# Patient Record
Sex: FEMALE | Race: WHITE | NOT HISPANIC OR LATINO | Employment: UNEMPLOYED | URBAN - METROPOLITAN AREA
[De-identification: names, ages, dates, MRNs, and addresses within clinical notes are randomized per-mention and may not be internally consistent; named-entity substitution may affect disease eponyms.]

---

## 2021-01-15 ENCOUNTER — OFFICE VISIT (OUTPATIENT)
Dept: ORTHOPEDIC SURGERY | Facility: CLINIC | Age: 26
End: 2021-01-15

## 2021-01-15 VITALS
HEART RATE: 64 BPM | WEIGHT: 159.4 LBS | BODY MASS INDEX: 28.24 KG/M2 | DIASTOLIC BLOOD PRESSURE: 69 MMHG | HEIGHT: 63 IN | SYSTOLIC BLOOD PRESSURE: 101 MMHG

## 2021-01-15 DIAGNOSIS — M25.511 RIGHT SHOULDER PAIN, UNSPECIFIED CHRONICITY: ICD-10-CM

## 2021-01-15 DIAGNOSIS — G25.89 SCAPULAR DYSKINESIS: Primary | ICD-10-CM

## 2021-01-15 PROCEDURE — 99203 OFFICE O/P NEW LOW 30 MIN: CPT | Performed by: FAMILY MEDICINE

## 2021-01-15 RX ORDER — ASCORBIC ACID 100 MG
500 TABLET,CHEWABLE ORAL
COMMUNITY

## 2021-01-15 RX ORDER — SACCHAROMYCES BOULARDII 250 MG
250 CAPSULE ORAL 2 TIMES DAILY
COMMUNITY

## 2021-01-15 NOTE — PROGRESS NOTES
Primary Care Sports Medicine Office Visit Note     Patient ID: Liane Thorne is a 25 y.o. female.    Chief Complaint:  Chief Complaint   Patient presents with   • Right Shoulder - Initial Evaluation, Pain     Rock climbing in 2017, injured at that time     HPI:    Ms. Liane Thorne is a 25 y.o. female , who is 19w1d, who presents to the clinic today for R shoulder pain. She states that she had an injury 3 years ago rock climbing. She had frozen shoulder at that time, and worked through this issue with PT. She had a repetitive irritation to this shoulder musculature a year ago as well. Near constant pain now. Has 7 month of, that is difficult to  and hold now as well. Cannot hold her for any extended period without severe shoulder pain. Points to her scapula when describing her pain. No numbness or tingling. Pain radiates mildly to the lateral shoulder and lateral upper arm. Feels she has mild weakness of the R shoulder as well.     History reviewed. No pertinent past medical history.    Past Surgical History:   Procedure Laterality Date   • CHOLECYSTECTOMY     • DILATATION AND CURETTAGE         Family History   Problem Relation Age of Onset   • Heart failure Mother    • No Known Problems Father    • Heart failure Sister    • Heart failure Brother    • No Known Problems Son    • No Known Problems Daughter    • Heart failure Maternal Grandmother    • No Known Problems Maternal Grandfather    • No Known Problems Paternal Grandmother    • No Known Problems Paternal Grandfather    • No Known Problems Cousin      Social History     Occupational History   • Not on file   Tobacco Use   • Smoking status: Never Smoker   • Smokeless tobacco: Never Used   Substance and Sexual Activity   • Alcohol use: No   • Drug use: No   • Sexual activity: Defer      Review of Systems   Constitutional: Negative for activity change and fever.   Respiratory: Negative for cough and shortness of breath.    Cardiovascular: Negative for  "chest pain.   Gastrointestinal: Negative for constipation, diarrhea, nausea and vomiting.   Musculoskeletal: Positive for arthralgias.   Skin: Negative for color change and rash.   Neurological: Negative for weakness.   Hematological: Does not bruise/bleed easily.       Objective:    /69 (BP Location: Left arm, Patient Position: Sitting, Cuff Size: Large Adult)   Pulse 64   Ht 160 cm (63\")   Wt 72.3 kg (159 lb 6.4 oz)   BMI 28.24 kg/m²     Physical Examination:  Physical Exam  Vitals signs and nursing note reviewed.   Constitutional:       General: She is not in acute distress.     Appearance: She is well-developed. She is not diaphoretic.   HENT:      Head: Normocephalic and atraumatic.   Eyes:      Conjunctiva/sclera: Conjunctivae normal.   Pulmonary:      Effort: Pulmonary effort is normal. No respiratory distress.   Skin:     General: Skin is warm.      Capillary Refill: Capillary refill takes less than 2 seconds.   Neurological:      Mental Status: She is alert.       Right Shoulder Exam     Tenderness   Right shoulder tenderness location: Right-sided upper trapezius musculature, right-sided rhomboid and thoracic paraspinal musculature.    Range of Motion   Active abduction: normal   Passive abduction: normal   Extension: normal   External rotation: normal   Forward flexion: normal   Internal rotation 0 degrees: normal     Muscle Strength   External rotation: 5/5   Supraspinatus: 5/5   Subscapularis: 5/5   Biceps: 5/5     Tests   Layne test: negative  Drop arm: negative  Sulcus: absent    Other   Erythema: absent  Sensation: normal  Pulse: present    Comments:  Negative Calvin, negative resisted external rotation, negative liftoff.  Negative Linn's, negative scarf.  Negative Neer.  Negative speeds/Yergason's.      Cervical range of motion is full with no tenderness to palpation to the bony midline or paraspinal cervical musculature.  Spurling's maneuver to the right is negative.          Imaging " "and other tests:  Three-view x-ray of the right shoulder yields no obvious fracture, malalignment, or dislocation.  Essentially negative XR right shoulder.    Assessment and Plan:    1. Right shoulder pain, unspecified chronicity  - XR Shoulder 2+ View Right  - Ambulatory Referral to Physical Therapy Evaluate and treat; Stretching, Strengthening, ROM    2. Scapular dyskinesis  - Ambulatory Referral to Physical Therapy Evaluate and treat; Stretching, Strengthening, ROM    I discussed with this patient today that ultimately I feel her shoulder pain is related to scapular musculature and scapular dyskinesis.  She has a mild rhomboid/trapezius strain as well.  I would like for her to start physical therapy for stretching and strengthening to shoulder girdle musculature.  RTC in 4-6 weeks if no improvement.    Ulises CHANG \"Chance\" Gray BRICE DO, CAQSM  01/19/21  16:10 EST    Disclaimer: Please note that areas of this note were completed with computer voice recognition software.  Quite often unanticipated grammatical, syntax, homophones, and other interpretive errors are inadvertently transcribed by the computer software. Please excuse any errors that have escaped final proofreading.  "

## 2021-02-01 ENCOUNTER — TREATMENT (OUTPATIENT)
Dept: PHYSICAL THERAPY | Facility: CLINIC | Age: 26
End: 2021-02-01

## 2021-02-01 DIAGNOSIS — M25.511 CHRONIC RIGHT SHOULDER PAIN: ICD-10-CM

## 2021-02-01 DIAGNOSIS — G25.89 SCAPULAR DYSKINESIS: Primary | ICD-10-CM

## 2021-02-01 DIAGNOSIS — G89.29 CHRONIC RIGHT SHOULDER PAIN: ICD-10-CM

## 2021-02-01 PROCEDURE — 97162 PT EVAL MOD COMPLEX 30 MIN: CPT | Performed by: PHYSICAL THERAPIST

## 2021-02-01 PROCEDURE — 97530 THERAPEUTIC ACTIVITIES: CPT | Performed by: PHYSICAL THERAPIST

## 2021-02-01 NOTE — PROGRESS NOTES
Physical Therapy Initial Evaluation and Plan of Care    Patient: Liane Thorne   : 1995  Diagnosis/ICD-10 Code:  Scapular dyskinesis [G25.89]  Referring practitioner: Ulises Castellano I*  Date of Initial Visit: 2021  Today's Date: 2021  Patient seen for 1 sessions           Subjective Questionnaire: QuickDASH: 27%    Subjective Evaluation    History of Present Illness  Mechanism of injury: History of current condition: hurt her right shoulder 4 years ago when rock climbing, immediately in a lot of pain and has problems. Had physical therapy 3 years ago and it helped a lot. Never had MRI but xray was negative. Says her PT told her she had a pinched nerve in her shoulder. Says she is also has neck and right upper trap pain. Says her pain has never fully gone away, and got a lot worse after having her baby (has an 8 month old and is 20 weeks pregnant). Can't hardly support her baby. Currently pain is in the back of her neck and down around the shoulder blade. Denies N/T    Makes symptoms worse: holding baby, any activity with her right arm. Can't sleep on right side,     Makes symptoms better: rest, heat, deep massage    Reported functional limitation: Can't take care of baby without pain      Quality of life: excellent    Pain  Current pain ratin  At best pain ratin  At worst pain ratin  Quality: burning    Diagnostic Tests  X-ray: normal    Treatments  Previous treatment: chiropractic and physical therapy  Patient Goals  Patient goals for therapy: increased strength, increased motion, decreased pain, independence with ADLs/IADLs and return to sport/leisure activities           Precautions: currently 22 weeks pregnant    Objective          Postural Observations    Additional Postural Observation Details  Slightly forward head and shoulders in sitting    Palpation     Right Tenderness of the levator scapulae, scalenes and upper trapezius.     Neurological Testing     Sensation  "  Cervical/Thoracic   Left   Intact: light touch    Right   Intact: light touch    Active Range of Motion   Cervical/Thoracic Spine   Cervical    Flexion: WFL and with pain  Extension: WFL  Left lateral flexion: 35 degrees   Right lateral flexion: 40 degrees   Left rotation: 70 degrees   Right rotation: 65 degrees   Left Shoulder   Flexion: 155 degrees   Abduction: 155 degrees     Right Shoulder   Flexion: 150 degrees with pain  Abduction: 150 degrees     Passive Range of Motion     Right Shoulder   Flexion: 175 (\"pulling\" sensation down arm) degrees     Additional Passive Range of Motion Details  Cervical PIVM assessment: hypomobile C4-6 downglides on right  Hypomobile 1st rib to inferior glide    Scapular Mobility     Additional Scapular Mobility Details  At rest right scapula upwardly rotated and mild winging    Strength/Myotome Testing   Cervical Spine     Left   Normal strength    Right Shoulder     Planes of Motion   Flexion: 4   External rotation at 0°: 4   Internal rotation at 0°: 4     Isolated Muscles   Lower trapezius: 3   Middle trapezius: 3     Right Elbow   Flexion: 4+  Extension: 4+    Additional Strength Details  R  strength: 45 lbs  L  strength: 60 lbs  Right : 45 lbs  Left : 63 lbs    Tests   Cervical     Right   Negative alar ligament integrity and Spurling's sign.     Right Shoulder   Positive Neer's.   Negative empty can, external rotation lag sign, painful arc and Speed's.           Assessment & Plan     Assessment  Impairments: abnormal or restricted ROM, impaired physical strength, lacks appropriate home exercise program and pain with function  Assessment details: The patient is a 25 y.o. female who presents to physical therapy today for worsening right shoulder and neck pain. Upon initial evaluation, the patient demonstrates the following impairments: decreased cervical ROM, decreased shoulder ROM, limited muscle flexibility, weakness of right scapula and shoulder " musculature, signs of increased neural tension RUE, right  weakness. Due to these impairments, the patient is unable to perform or has difficulty with the following functional tasks: pain with any UE movements, difficulty taking care of her child. The patient would benefit from skilled PT services to address functional limitations and impairments and to improve patient quality of life.      Prognosis: good    Goals  Plan Goals: STG:  Pt will be independent and compliant with initial HEP in 3 weeks.  Pt will report a 25% improvement in symptoms since starting therapy in 3 weeks.  Pt will demonstrate an increase in shoulder flexion AROM to 160 degrees within 3 weeks.   Pt will report pain level at worst <5 during childcare activity in 3 weeks.  LTG:  Pt will be independent with final HEP for self-management of condition by DC.  Pt will improve score on QuickDASH to less than 10% impairment by DC.   Pt will report a 75% improvement in symptoms by DC in order to allow return to PLOF.  Pt will not have any radicular symptoms past shoulder by DC.  Pt will demonstrate right shoulder and scapular strength of 5/5 without pain by DC.  Pt will improve right  strength of >55 lbs by DC.        Plan  Therapy options: will be seen for skilled physical therapy services  Planned modality interventions: cryotherapy, electrical stimulation/Russian stimulation, TENS, thermotherapy (hydrocollator packs), ultrasound and dry needling  Planned therapy interventions: body mechanics training, ADL retraining, flexibility, functional ROM exercises, home exercise program, joint mobilization, manual therapy, motor coordination training, neuromuscular re-education, postural training, soft tissue mobilization, spinal/joint mobilization, strengthening, stretching and therapeutic activities  Frequency: 2x week  Duration in visits: 20  Treatment plan discussed with: patient        See flowsheets for treatment detail.    History # of Personal  Factors and/or Comorbidities: MODERATE (1-2)  Examination of Body System(s): # of elements: MODERATE (3)  Clinical Presentation: EVOLVING  Clinical Decision Making: MODERATE      Timed:         Manual Therapy:         mins  87841;     Therapeutic Exercise:         mins  31431;     Neuromuscular Tadeo:  10      mins  12048;    Therapeutic Activity:          mins  21742;     Gait Training:           mins  32629;     Ultrasound:          mins  71787;    Ionto                                   mins   31028  Self Care                            mins   14539      Un-Timed:  Electrical Stimulation:         mins  57126 ( );  Dry Needling          mins self-pay  Traction          mins 23627  Low Eval          Mins  84273  Mod Eval     35     Mins  89726  High Eval                            Mins  82972  Re-Eval                               mins  06057      Timed Treatment:   10   mins   Total Treatment:     45   mins    PT SIGNATURE: Opal Carlton PT   DATE TREATMENT INITIATED: 2/1/2021    Initial Certification  Certification Period: 5/2/2021  I certify that the therapy services are furnished while this patient is under my care.  The services outlined above are required by this patient, and will be reviewed every 90 days.     PHYSICIAN: Ulises Castellano II, DO      DATE:     Please sign and return via fax to [phone].. Thank you, [facility].

## 2021-02-08 ENCOUNTER — TREATMENT (OUTPATIENT)
Dept: PHYSICAL THERAPY | Facility: CLINIC | Age: 26
End: 2021-02-08

## 2021-02-08 DIAGNOSIS — G89.29 CHRONIC RIGHT SHOULDER PAIN: ICD-10-CM

## 2021-02-08 DIAGNOSIS — G25.89 SCAPULAR DYSKINESIS: Primary | ICD-10-CM

## 2021-02-08 DIAGNOSIS — M25.511 CHRONIC RIGHT SHOULDER PAIN: ICD-10-CM

## 2021-02-08 PROCEDURE — 97110 THERAPEUTIC EXERCISES: CPT | Performed by: PHYSICAL THERAPIST

## 2021-02-08 PROCEDURE — 97140 MANUAL THERAPY 1/> REGIONS: CPT | Performed by: PHYSICAL THERAPIST

## 2021-02-08 NOTE — PROGRESS NOTES
Physical Therapy Daily Progress Note    VISIT#: 2    Subjective   Liane Thorne reports that she did well with the exercises and has some soreness today but relates it to cosleeping with her daughter leading to some discomfort.   Pain Rating (0/10): 3    Objective     See Exercise, Manual, and Modality Logs for complete treatment.     Assessment/Plan   Pt with hypertonicity of R UT today with slight decrease noted following manual therapy. UT and LS stretch added today with no increase in pain noted. MH added at the end of the session for decreased pain and decreased hypertpnocity    Plan  Progress per Plan of Care and Progress strengthening /stabilization /functional activity            Timed:         Manual Therapy:    15     mins  48927;     Therapeutic Exercise:    25     mins  24001;     Neuromuscular Tadeo:        mins  05209;    Therapeutic Activity:          mins  93046;     Gait Training:           mins  51523;     Ultrasound:          mins  42439;    Ionto                                   mins   36818  Self Care                            mins   05135    Un-Timed:  Electrical Stimulation:         mins  50823 ( );  Dry Needling          mins self-pay  Traction          mins 68673  Low Eval          Mins  79563  Mod Eval          Mins  52067  High Eval                            Mins  67189  Re-Eval                               mins  02442    Timed Treatment:   40   mins   Total Treatment:     40   mins    Isadora Roper PT, DPT  Physical Therapist

## 2021-02-18 ENCOUNTER — TELEPHONE (OUTPATIENT)
Dept: PHYSICAL THERAPY | Facility: CLINIC | Age: 26
End: 2021-02-18

## 2021-02-22 ENCOUNTER — TREATMENT (OUTPATIENT)
Dept: PHYSICAL THERAPY | Facility: CLINIC | Age: 26
End: 2021-02-22

## 2021-02-22 DIAGNOSIS — G89.29 CHRONIC RIGHT SHOULDER PAIN: ICD-10-CM

## 2021-02-22 DIAGNOSIS — G25.89 SCAPULAR DYSKINESIS: Primary | ICD-10-CM

## 2021-02-22 DIAGNOSIS — M25.511 CHRONIC RIGHT SHOULDER PAIN: ICD-10-CM

## 2021-02-22 PROCEDURE — 97140 MANUAL THERAPY 1/> REGIONS: CPT | Performed by: PHYSICAL THERAPIST

## 2021-02-22 PROCEDURE — 97530 THERAPEUTIC ACTIVITIES: CPT | Performed by: PHYSICAL THERAPIST

## 2021-02-22 PROCEDURE — 97110 THERAPEUTIC EXERCISES: CPT | Performed by: PHYSICAL THERAPIST

## 2021-02-22 NOTE — PROGRESS NOTES
Physical Therapy Daily Progress Note    Patient: Liane Thorne  : 1995  Referring practitioner: Ulises Castellano I*  Today's Date: 2021    VISIT#: 3    Subjective   Liane Thorne reports: Says she is doing about the same overall. Says this morning her neck has been really tight, but the last few days had been pretty good.     Objective     See Exercise, Manual, and Modality Logs for complete treatment.     Patient Education:    Assessment & Plan     Assessment  Assessment details: Good response to session, progressed HEP. Significant muscle knot and tension right upper trap, unable to fully release with IASTM, but reported decreased tension after. Min cues for avoiding upper trap compensation.     Plan  Frequency: 2x week      Progress per Plan of Care          Timed:         Manual Therapy:    15     mins  50729;     Therapeutic Exercise:    20    mins  40359;     Neuromuscular Tadeo:        mins  94538;    Therapeutic Activity:     10     mins  42176;     Gait Training:           mins  18518;     Ultrasound:          mins  76554;    Ionto:                                   mins   65463  Self Care:                            mins   95351    Un-Timed:  Electrical Stimulation:         mins  74626 ( );  Dry Needling          mins self-pay  Traction          mins 00954  Re-Eval                               mins  14172    Timed Treatment:   45   mins   Total Treatment:     45   mins    Opal Carlton, PT  Physical Therapist

## 2021-02-25 ENCOUNTER — TREATMENT (OUTPATIENT)
Dept: PHYSICAL THERAPY | Facility: CLINIC | Age: 26
End: 2021-02-25

## 2021-02-25 DIAGNOSIS — G89.29 CHRONIC RIGHT SHOULDER PAIN: ICD-10-CM

## 2021-02-25 DIAGNOSIS — M25.511 CHRONIC RIGHT SHOULDER PAIN: ICD-10-CM

## 2021-02-25 DIAGNOSIS — G25.89 SCAPULAR DYSKINESIS: Primary | ICD-10-CM

## 2021-02-25 PROCEDURE — 97140 MANUAL THERAPY 1/> REGIONS: CPT | Performed by: PHYSICAL THERAPIST

## 2021-02-25 PROCEDURE — 97112 NEUROMUSCULAR REEDUCATION: CPT | Performed by: PHYSICAL THERAPIST

## 2021-02-25 PROCEDURE — 97110 THERAPEUTIC EXERCISES: CPT | Performed by: PHYSICAL THERAPIST

## 2021-02-25 NOTE — PROGRESS NOTES
Physical Therapy Daily Progress Note    Patient: Liane TAYLOR Thorne  : 1995  Referring practitioner: Ulises Castellano I*  Today's Date: 2021    VISIT#: 4    Subjective   Liane Thorne reports: Says she did well after last session, felt a little bit better and less painful      Objective     See Exercise, Manual, and Modality Logs for complete treatment.     Patient Education: progressed HEP.    Assessment & Plan     Assessment  Assessment details: Good response to session, still requires intermittent cues to prevent scapular hiking. Felt fatigue with new exercises today but no increased pain. Significant redness along upper trap and all around scapular musculature, still has palpable muscle knot right upper trap.     Plan  Frequency: 2x week      Progress per Plan of Care          Timed:         Manual Therapy:    15     mins  57169;     Therapeutic Exercise:    20     mins  09408;     Neuromuscular Tadeo:    10    mins  27653;    Therapeutic Activity:          mins  32339;     Gait Training:           mins  12534;     Ultrasound:          mins  17313;    Ionto:                                   mins   59314  Self Care:                            mins   48874    Un-Timed:  Electrical Stimulation:         mins  69973 ( );  Dry Needling          mins self-pay  Traction          mins 49190  Re-Eval                               mins  21029    Timed Treatment:   45   mins   Total Treatment:     45   mins    Opal Carlton, PT  Physical Therapist

## 2021-03-02 ENCOUNTER — TREATMENT (OUTPATIENT)
Dept: PHYSICAL THERAPY | Facility: CLINIC | Age: 26
End: 2021-03-02

## 2021-03-02 DIAGNOSIS — G25.89 SCAPULAR DYSKINESIS: Primary | ICD-10-CM

## 2021-03-02 DIAGNOSIS — G89.29 CHRONIC RIGHT SHOULDER PAIN: ICD-10-CM

## 2021-03-02 DIAGNOSIS — M25.511 CHRONIC RIGHT SHOULDER PAIN: ICD-10-CM

## 2021-03-02 PROCEDURE — 97140 MANUAL THERAPY 1/> REGIONS: CPT | Performed by: PHYSICAL THERAPIST

## 2021-03-02 PROCEDURE — 97112 NEUROMUSCULAR REEDUCATION: CPT | Performed by: PHYSICAL THERAPIST

## 2021-03-02 PROCEDURE — 97110 THERAPEUTIC EXERCISES: CPT | Performed by: PHYSICAL THERAPIST

## 2021-03-02 NOTE — PROGRESS NOTES
Physical Therapy Daily Progress Note        Patient: Liane Thorne   : 1995  Diagnosis/ICD-10 Code:  Scapular dyskinesis [G25.89]  Referring practitioner: Ulises Castellano I*  No follow up.   Date of Initial Visit: Type: THERAPY  Noted: 2021  Today's Date: 3/2/2021  Patient seen for 5 sessions             Subjective :  Pain 4/10 R UT into shoulder.  Notes that she mostly has pain in the morning now and after doing HEP improves.  She rates improvement at 25%.   Objective   See Exercise, Manual, and Modality Logs for complete treatment.     Education:  Lumbar support/towel roll when sitting.  Also shown how to use towel roll in pillow for cervical support when sleeping.       Assessment/Plan:  Pt. With flat thoracic spine and minimal motion.  Forward head.  Poor posture contributing to symptoms.   Goals met as noted.     Plan Goals: STG:  Pt will be independent and compliant with initial HEP in 3 weeks.-MET  Pt will report a 25% improvement in symptoms since starting therapy in 3 weeks.-MET  Pt will demonstrate an increase in shoulder flexion AROM to 160 degrees within 3 weeks.   Pt will report pain level at worst <5 during childcare activity in 3 weeks.    Progress per Plan of Care:  Monitor response to changes, add thoracic mobs.         Manual Therapy:            15     mins  99026;                  Therapeutic Exercise:    20     mins  58228;     Neuromuscular Tadeo:    10    mins  92878;    Therapeutic Activity:           mins  32163;     Gait Training:                      mins  90837;     Ultrasound:                          mins  99978;    Ionto:                                   mins   18819  Self Care:                            mins   65316     Un-Timed:  Electrical Stimulation:         mins  31688 ( );  Dry Needling                       mins self-pay  Traction                               mins 83109  Re-Eval                               mins  99710     Timed Treatment:   45    mins   Total Treatment:     45   mins       Deena Holt PTA  Physical Therapist Assistant

## 2021-03-05 ENCOUNTER — TREATMENT (OUTPATIENT)
Dept: PHYSICAL THERAPY | Facility: CLINIC | Age: 26
End: 2021-03-05

## 2021-03-05 DIAGNOSIS — G25.89 SCAPULAR DYSKINESIS: Primary | ICD-10-CM

## 2021-03-05 PROCEDURE — 97110 THERAPEUTIC EXERCISES: CPT | Performed by: PHYSICAL THERAPIST

## 2021-03-05 PROCEDURE — 97112 NEUROMUSCULAR REEDUCATION: CPT | Performed by: PHYSICAL THERAPIST

## 2021-03-05 PROCEDURE — 97140 MANUAL THERAPY 1/> REGIONS: CPT | Performed by: PHYSICAL THERAPIST

## 2021-03-05 NOTE — PROGRESS NOTES
Physical Therapy Daily Progress Note        Patient: Liane Thorne   : 1995  Diagnosis/ICD-10 Code:  Scapular dyskinesis [G25.89]  Referring practitioner: Ulises Castellano I*  Date of Initial Visit: Type: THERAPY  Noted: 2021  Today's Date: 3/5/2021  Patient seen for 6 sessions.  POC 20, 2x/wk             Subjective :  Pt. Did well after last session.  She is using towel roll in pillow and it has really helped.  Pain 2/10.     Objective   See Exercise, Manual, and Modality Logs for complete treatment.   Progression as noted.     Education: HEP issued as noted.      Exercises  Standing Shoulder Row with Anchored Resistance - 10 reps - 2 sets - 3x weekly  Standing Shoulder Horizontal Abduction with Resistance - 10 reps - 2 sets - 3x weekly  Shoulder External Rotation and Scapular Retraction with Resistance - 10 reps - 3 sets - 7x weekly      Assessment/Plan:  Patient appears to be responding well to interventions.  Posture is slowly improving.     Progress per Plan of Care:  Monitor response to thoracic mobilizations.            Manual Therapy:  15       mins  53545;  Therapeutic Exercise:   20      mins  17262;     Neuromuscular Tadeo:   10     mins  74145;    Therapeutic Activity:          mins  48781;     Gait Training:           mins  66341;     Ultrasound:          mins  90426;    Electrical Stimulation:         mins  94308 ( );  Dry Needling          mins self-pay    Timed Treatment: 45     mins   Total Treatment:    45   mins    Deena Holt PTA  Physical Therapist

## 2021-03-09 ENCOUNTER — TREATMENT (OUTPATIENT)
Dept: PHYSICAL THERAPY | Facility: CLINIC | Age: 26
End: 2021-03-09

## 2021-03-09 DIAGNOSIS — G25.89 SCAPULAR DYSKINESIS: Primary | ICD-10-CM

## 2021-03-09 DIAGNOSIS — M25.511 CHRONIC RIGHT SHOULDER PAIN: ICD-10-CM

## 2021-03-09 DIAGNOSIS — G89.29 CHRONIC RIGHT SHOULDER PAIN: ICD-10-CM

## 2021-03-09 PROCEDURE — 97112 NEUROMUSCULAR REEDUCATION: CPT | Performed by: PHYSICAL THERAPIST

## 2021-03-09 PROCEDURE — 97110 THERAPEUTIC EXERCISES: CPT | Performed by: PHYSICAL THERAPIST

## 2021-03-09 PROCEDURE — 97140 MANUAL THERAPY 1/> REGIONS: CPT | Performed by: PHYSICAL THERAPIST

## 2021-03-09 NOTE — PROGRESS NOTES
Physical Therapy Progress Note    Patient: Liane Thorne  : 1995  Referring practitioner: Ulises Castellano I*  Today's Date: 3/9/2021    VISIT#: 7    Subjective   Liane Thorne reports: Doing well, feels about 60% better overall since starting therapy.       Objective     See Exercise, Manual, and Modality Logs for complete treatment.     Patient Education: Progressed HEP.    Assessment & Plan     Assessment  Assessment details: Liane is making excellent progress with therapy. She has less muscle guarding and tension around her right shoulder blade and is having less frequent radicular symptoms down her arm. She still requires occasional cues for decreased upper trap compensation and still demonstrates weakness of her middle and lower trap.     Goals  Plan Goals: STG:  Pt will be independent and compliant with initial HEP in 3 weeks. MET  Pt will report a 25% improvement in symptoms since starting therapy in 3 weeks. MET  Pt will demonstrate an increase in shoulder flexion AROM to 160 degrees within 3 weeks. NT  Pt will report pain level at worst <5 during childcare activity in 3 weeks. MET  LTG:  Pt will be independent with final HEP for self-management of condition by DC. NOT MET  Pt will improve score on QuickDASH to less than 10% impairment by DC. NOT MET  Pt will report a 75% improvement in symptoms by DC in order to allow return to PLOF. NOT MET  Pt will not have any radicular symptoms past shoulder by DC. NOT MET  Pt will demonstrate right shoulder and scapular strength of 5/5 without pain by DC. NOT MET  Pt will improve right  strength of >55 lbs by DC. nOT MET      Plan  Frequency: 2x week  Plan details: Continue IASTM and scapular strengthening/stabilization              Timed:         Manual Therapy:    15     mins  33796;     Therapeutic Exercise:    20     mins  88324;     Neuromuscular Tadeo:    10    mins  18838;    Therapeutic Activity:          mins  21784;     Gait Training:            mins  97293;     Ultrasound:          mins  08962;    Ionto:                                   mins   11400  Self Care:                            mins   31916    Un-Timed:  Electrical Stimulation:         mins  90004 ( );  Dry Needling          mins self-pay  Traction          mins 27238  Re-Eval                               mins  83864    Timed Treatment:   45   mins   Total Treatment:     45   mins    Opal Carlton, HEIDE  Physical Therapist

## 2021-03-26 ENCOUNTER — TREATMENT (OUTPATIENT)
Dept: PHYSICAL THERAPY | Facility: CLINIC | Age: 26
End: 2021-03-26

## 2021-03-26 DIAGNOSIS — G25.89 SCAPULAR DYSKINESIS: Primary | ICD-10-CM

## 2021-03-26 PROCEDURE — 97140 MANUAL THERAPY 1/> REGIONS: CPT | Performed by: PHYSICAL THERAPIST

## 2021-03-26 PROCEDURE — 97112 NEUROMUSCULAR REEDUCATION: CPT | Performed by: PHYSICAL THERAPIST

## 2021-03-26 PROCEDURE — 97110 THERAPEUTIC EXERCISES: CPT | Performed by: PHYSICAL THERAPIST

## 2021-03-26 NOTE — PROGRESS NOTES
Physical Therapy Daily Progress Note        Patient: Liane Thorne   : 1995  Diagnosis/ICD-10 Code:  Scapular dyskinesis [G25.89]  Referring practitioner: Ulises Castellano I*  Date of Initial Visit: Type: THERAPY  Noted: 2021  Today's Date: 3/26/2021  Patient seen for 8 sessions.  POC 20, 2x/wk    PRECAUTIONS:  29 weeks pregnant           Subjective :  Pt. Doing really good.  She has missed 2 weeks of PT and feels she has done well in spite of that.  She would like to try 1x/wk for 2 weeks and she how she does.  Maddie LUCERO UT, 3/10.     Objective   See Exercise, Manual, and Modality Logs for complete treatment.   Progression as noted.     Education:     Assessment/Plan:  Pt. Performs there ex with good control and mechanics. Some fatigue noted.  She continues to progress and feels ready to decrease frequency of therapy visits.       Progress per Plan of Care:   Decrease to 1x/wk x 2 weeks and then re-asses for possible discharge.            Manual Therapy:  15       mins  83799;  Therapeutic Exercise:   20      mins  47245;     Neuromuscular Tadeo:   10     mins  78446;    Therapeutic Activity:          mins  13076;     Gait Training:           mins  98010;     Ultrasound:          mins  40721;    Electrical Stimulation:         mins  59780 ( );  Dry Needling          mins self-pay    Timed Treatment: 45     mins   Total Treatment:    45   mins    Deena Holt PTA  Physical Therapist Assistant

## 2021-04-02 ENCOUNTER — TREATMENT (OUTPATIENT)
Dept: PHYSICAL THERAPY | Facility: CLINIC | Age: 26
End: 2021-04-02

## 2021-04-02 DIAGNOSIS — G25.89 SCAPULAR DYSKINESIS: Primary | ICD-10-CM

## 2021-04-02 PROCEDURE — 97112 NEUROMUSCULAR REEDUCATION: CPT | Performed by: PHYSICAL THERAPIST

## 2021-04-02 PROCEDURE — 97140 MANUAL THERAPY 1/> REGIONS: CPT | Performed by: PHYSICAL THERAPIST

## 2021-04-02 PROCEDURE — 97110 THERAPEUTIC EXERCISES: CPT | Performed by: PHYSICAL THERAPIST

## 2021-04-02 NOTE — PROGRESS NOTES
Physical Therapy Daily Progress Note        Patient: Liane Thorne   : 1995  Diagnosis/ICD-10 Code:  Scapular dyskinesis [G25.89]  Referring practitioner: Ulises Castellano I*  Date of Initial Visit: Type: THERAPY  Noted: 2021  Today's Date: 2021  Patient seen for 9 sessions.  POC 20, 2x/wk    PRECAUTIONS:  30 weeks pregnant           Subjective :  Patient doing well.  Notes R side neck still tight, sore, 3/10.   Objective   See Exercise, Manual, and Modality Logs for complete treatment. Progression as noted.       Education:     Assessment/Plan:  Modified bent over T and Y to standing facing wall due to back pain in bent over position.  Still with tension B UT's.     Progress per Plan of Care:  Continue 1x/wk         Manual Therapy:  15       mins  16178;  Therapeutic Exercise:   20      mins  17457;     Neuromuscular Tadeo:   10     mins  78415;    Therapeutic Activity:          mins  80013;     Gait Training:           mins  96025;     Ultrasound:          mins  94533;    Electrical Stimulation:         mins  68413 ( );  Dry Needling          mins self-pay    Timed Treatment: 45     mins   Total Treatment:    45   mins    Deena Holt PTA  Physical Therapist Assistant

## 2021-04-09 ENCOUNTER — TREATMENT (OUTPATIENT)
Dept: PHYSICAL THERAPY | Facility: CLINIC | Age: 26
End: 2021-04-09

## 2021-04-09 DIAGNOSIS — G25.89 SCAPULAR DYSKINESIS: Primary | ICD-10-CM

## 2021-04-09 PROCEDURE — 97140 MANUAL THERAPY 1/> REGIONS: CPT | Performed by: PHYSICAL THERAPIST

## 2021-04-09 PROCEDURE — 97110 THERAPEUTIC EXERCISES: CPT | Performed by: PHYSICAL THERAPIST

## 2021-04-09 PROCEDURE — 97112 NEUROMUSCULAR REEDUCATION: CPT | Performed by: PHYSICAL THERAPIST

## 2021-04-09 NOTE — PROGRESS NOTES
Physical Therapy Daily Progress Note        Patient: Liane Thorne   : 1995  Diagnosis/ICD-10 Code:  Scapular dyskinesis [G25.89]  Referring practitioner: Ulises Castellano I*  Date of Initial Visit: Type: THERAPY  Noted: 2021  Today's Date: 2021  Patient seen for 10 sessions.  POC 20, 2x/wk    PRECAUTIONS:  31 weeks pregnant           Subjective : Pt. Has been working all week in garden.  She is pretty sore today in [facility], 5/10.      Objective   See Exercise, Manual, and Modality Logs for complete treatment. Progression as noted. Added UBE.      Education: Stretch after physical activity. Pay attention to mechanics with UE activity, drop shoulders and pull shoulder blades in and down.     Assessment/Plan:  Pt. With setback due to 2 days of gardening.  She still feels she will be ready for discharge soon and is considering getting regular massages.     Progress per Plan of Care:  Continue 1x/wk         Manual Therapy:  15       mins  01425;  Therapeutic Exercise:   20      mins  12181;     Neuromuscular Tadeo:   10     mins  23315;    Therapeutic Activity:          mins  63159;     Gait Training:           mins  95864;     Ultrasound:          mins  98545;    Electrical Stimulation:         mins  88408 ( );  Dry Needling          mins self-pay    Timed Treatment: 45     mins   Total Treatment:    45   mins    Deena Holt PTA  Physical Therapist Assistant

## 2021-04-16 ENCOUNTER — TREATMENT (OUTPATIENT)
Dept: PHYSICAL THERAPY | Facility: CLINIC | Age: 26
End: 2021-04-16

## 2021-04-16 DIAGNOSIS — G89.29 CHRONIC RIGHT SHOULDER PAIN: ICD-10-CM

## 2021-04-16 DIAGNOSIS — M25.511 CHRONIC RIGHT SHOULDER PAIN: ICD-10-CM

## 2021-04-16 DIAGNOSIS — G25.89 SCAPULAR DYSKINESIS: Primary | ICD-10-CM

## 2021-04-16 PROCEDURE — 97110 THERAPEUTIC EXERCISES: CPT | Performed by: PHYSICAL THERAPIST

## 2021-04-16 PROCEDURE — 97112 NEUROMUSCULAR REEDUCATION: CPT | Performed by: PHYSICAL THERAPIST

## 2021-04-16 PROCEDURE — 97140 MANUAL THERAPY 1/> REGIONS: CPT | Performed by: PHYSICAL THERAPIST

## 2021-04-16 NOTE — PROGRESS NOTES
"   Physical Therapy Daily Progress Note        Patient: Liane Thorne   : 1995  Diagnosis/ICD-10 Code:  Scapular dyskinesis [G25.89]  Referring practitioner: Ulises Castellano I*  Date of Initial Visit: Type: THERAPY  Noted: 2021  Today's Date: 2021  Patient seen for 11 sessions.  POC 20, 2x/wk    PRECAUTIONS:  32 weeks pregnant           Subjective : Pt. Notes that this past week she has had some pain in R shoulder due to sleeping positions.  She is blaming this on end of pregnancy, being so big, and having to move a lot through night to get comfortable.  Pain 1/10 R UT.     Objective   See Exercise, Manual, and Modality Logs for complete treatment.       Education: Try \"hugging\" pillow when sleeping and laying on L side. Pt. Given written instructions on how to progress HEP on her own. Also, issued green theraband for home progression.     Assessment/Plan:  Pt. Feeling pretty good about progress and ability to continue on her own with HEP.     Progress per Plan of Care:  Re-assess for possible d/c to HEP.   Issue entire HEP.          Manual Therapy:  15       mins  25483;  Therapeutic Exercise:   20      mins  82835;     Neuromuscular Tadeo:   10     mins  71065;    Therapeutic Activity:          mins  47565;     Gait Training:           mins  72847;     Ultrasound:          mins  14110;    Electrical Stimulation:         mins  08859 ( );  Dry Needling          mins self-pay    Timed Treatment: 45     mins   Total Treatment:    45   mins    Deena Holt PTA  Physical Therapist Assistant                    "

## 2021-04-20 ENCOUNTER — TREATMENT (OUTPATIENT)
Dept: PHYSICAL THERAPY | Facility: CLINIC | Age: 26
End: 2021-04-20

## 2021-04-20 ENCOUNTER — DOCUMENTATION (OUTPATIENT)
Dept: PHYSICAL THERAPY | Facility: CLINIC | Age: 26
End: 2021-04-20

## 2021-04-20 DIAGNOSIS — G89.29 CHRONIC RIGHT SHOULDER PAIN: ICD-10-CM

## 2021-04-20 DIAGNOSIS — M25.511 CHRONIC RIGHT SHOULDER PAIN: ICD-10-CM

## 2021-04-20 DIAGNOSIS — G25.89 SCAPULAR DYSKINESIS: Primary | ICD-10-CM

## 2021-04-20 PROCEDURE — 97110 THERAPEUTIC EXERCISES: CPT | Performed by: PHYSICAL THERAPIST

## 2021-04-20 PROCEDURE — 97530 THERAPEUTIC ACTIVITIES: CPT | Performed by: PHYSICAL THERAPIST

## 2021-04-20 PROCEDURE — 97140 MANUAL THERAPY 1/> REGIONS: CPT | Performed by: PHYSICAL THERAPIST

## 2021-04-20 PROCEDURE — 97112 NEUROMUSCULAR REEDUCATION: CPT | Performed by: PHYSICAL THERAPIST

## 2021-04-20 NOTE — PROGRESS NOTES
Physical Therapy Daily Progress Note    VISIT#: 12    Subjective   Liane Thorne reports that she is doing better.  Pain levels 0-2  Usually stiff in the am.   Some NT    Objective   Quick DASH score:  18/55  :  71#  Shoulder flexion:  174  Instructions for postural checks of thoracic extension/string tight, increase shldr distance from the ears and cervical retraction due to resting posture of rounding of the thoracic spine/shldrs, shldr elevation and FHP 12 degrees.  MMT:  4/5 flex with mild UT activation.   See Exercise, Manual, and Modality Logs for complete treatment.     Patient Education:  review    Assessment/Plan   Liane has been to PT x's 12 sessions, including the eval date of 2/1/21 due to right shldr and neck pain.  Treatment interventions included manual therapy, therEx, therAct, neuroReed.  She progressed throughout the episode.  All STGs were achieved and all LTGs with the exception of MMT shldr and a 10% impairment from the QuickDash survey.  She is independent in her home ex program and no longer requires skilled PT intervention.    : STG: met  Pt will be independent and compliant with initial HEP in 3 weeks.  Pt will report a 25% improvement in symptoms since starting therapy in 3 weeks.  Pt will demonstrate an increase in shoulder flexion AROM to 160 degrees within 3 weeks.   Pt will report pain level at worst <5 during childcare activity in 3 weeks.    LTG:   Pt will be independent with final HEP for self-management of condition by DC.met  Pt will improve score on QuickDASH to less than 10% impairment by DC. Not met  Pt will report a 75% improvement in symptoms by DC in order to allow return to PLOF.met  Pt will not have any radicular symptoms past shoulder by DC.met   Pt will demonstrate right shoulder and scapular strength of 5/5 without pain by DC.not met  Pt will improve right  strength of >55 lbs by DC.met    Plan:  DC PT           Timed:         Manual Therapy:    11     mins  85269;      Therapeutic Exercise:    11     mins  50818;     Neuromuscular Tadeo:    10    mins  48975;    Therapeutic Activity:     14     mins  59240;     Gait Training:           mins  57124;     Ultrasound:          mins  27348;    Ionto                                   mins   57152  Self Care                            mins   31947  Canalith Repos                   mins  76549    Un-Timed:  Electrical Stimulation:         mins  93023 ( );  Dry Needling          mins self-pay  Traction          mins 76588  Low Eval          Mins  64783  Mod Eval          Mins  66920  High Eval                           Mins  67551  Re-Eval                               mins  90968    Timed Treatment:   45   mins   Total Treatment:     49   mins    Bonnie Botello, PT    Physical Therapist

## 2021-04-21 NOTE — PROGRESS NOTES
Discharge Summary  Discharge Summary from Physical Therapy Report      Dates  PT visit: 2/1/21 - 4/2-/21  Number of Visits: 4     Discharge Status of Patient: See MD Note dated 4/20/21    Goals: Partially Met    Discharge Plan: Continue with current home exercise program as instructed    Date of Discharge  4/20/21        Bonnie Botello, PT  Physical Therapist